# Patient Record
(demographics unavailable — no encounter records)

---

## 2024-10-11 NOTE — ASSESSMENT
[FreeTextEntry1] : ASSESSMENT: The patient comes in today with chronic exacerbated right shoulder pain.  He describes difficulty with overhead activities.  Symptoms are bothersome and are affecting his ADLs.  Symptoms today are consistent with right shoulder arthritis, tendinopathy, bursitis, impingement, weakness.  Treatment modalities were discussed, the patient elects for an injection.  We have also discussed activity modifications and home exercise program for his condition.  I do recommend physical therapy for his condition as well.   The patient was adequately and thoroughly informed of my assessment of their current condition(s).  - This may diminish bodily function for the extremity.  We discussed prognosis, treatment modalities including operative and nonoperative options for the above diagnostic assessment. As always, 2nd opinion is always provided as an option. For this, when accessible, I was able to review other physicians note(s) including reviewing other tests, imaging results as well as personally view these results for my own interpretation.      [Right] SUBACROMIAL SHOULDER INJECTION     Indication:  subacromial bursitis/impingement, pain     Risk, benefits and alternatives were discussed with the patient. Potential complications include bleeding and infection. Alcohol was used to prep the area. Ethyl chloride spray was used as a topical anesthetic.  Using sterile technique, the injection needle was then directed from a standard posterior approach parallel to and inferior to the acromion.  A 21g needle was used to inject 5 mL of 1% Lidocaine and 2 mL Kenalog (10mg). No significant resistance was encountered. A bandage was applied. The patient tolerated the procedure well. Patient instructed to avoid strenuous activity for 2 day(s). Specifically counseled regarding the signs and symptoms of potential infection and instructed to present promptly to clinic or hospital if such signs and symptoms arise.   The patient was adequately and thoroughly informed of my assessment of their current condition(s).   DISCUSSION: 1.  Right shoulder injection as above.  Activity modifications.  PT prescription provided.  HEP discussed. 2. [x] 3. [x]

## 2024-10-11 NOTE — PHYSICAL EXAM
[de-identified] : Examination of the [right] shoulder reveals equal active and passive motion as compared to the contralateral side   There is a positive Speed, positive Lorelei, positive Moreno, tenderness with anterior shoulder compression [de-identified] : [4] views of [right] shoulder were reviewed today in my office and were seen by me and discussed with the patient. These show AC arthropathy however I also do see findings consistent with rotator cuff arthropathy and glenohumeral OA moderate to severe

## 2024-10-11 NOTE — HISTORY OF PRESENT ILLNESS
[FreeTextEntry1] : Lauri is a 70-year-old male who presents today with chronic exacerbated right shoulder pain.  He describes difficulty with overhead activities.  Symptoms are bothersome and do affect his ADLs.

## 2024-11-27 NOTE — HISTORY OF PRESENT ILLNESS
[Pain Location] : pain [Worsening] : worsening [___ yrs] : [unfilled] year(s) ago [7] : a current pain level of 7/10 [de-identified] : 11/27/24) he completed Euflexxa injection in August states that his pain is worse.  He reports a fear of falling.  He uses a shopping cart and he feels secure.  He has a cane but he tends to not use it for short distance walking.  He is in therapy for the shoulder pain however he says knee pains are greater than the shoulder  08/2024) Patient is here for bilateral knee pain has been hurting for for 5 years. The pain fluctuate of its intensity Patient has a moderate to severe pain it is constant he feels achy burning pain and medial side of the knee resting makes the pain better once he wakes up and move around he has knee pain he reports stiffness patient tried physical therapy in the past which was helpful patient is taking Tylenol as needed He would like to try gel injection in both knee

## 2024-11-27 NOTE — PHYSICAL EXAM
[Antalgic] : antalgic [de-identified] : GENERAL APPEARANCE: Well nourished and hydrated, pleasant, alert, and oriented x 3. Appears their stated age.  HEENT: Normocephalic, extraocular eye motion intact. Nasal septum midline. Oral cavity clear. External auditory canal clear.  RESPIRATORY: Breath sounds clear and audible in all lobes. No wheezing, No accessory muscle use. CARDIOVASCULAR: No apparent abnormalities. No lower leg edema. No varicosities. Pedal pulses are palpable. NEUROLOGIC: Sensation is normal, no muscle weakness in the upper or lower extremities. DERMATOLOGIC: No apparent skin lesions, moist, warm, no rash. SPINE: Cervical spine appears normal and moves freely; thoracic spine appears normal and moves freely; lumbosacral spine appears normal and moves freely, normal, nontender. MUSCULOSKELETAL: Hands, wrists, and elbows are normal and move freely, shoulders are normal and move freely.  Musculoskeletal 5/5 motor strength in bilateral lower extremities. Sensory: Intact in bilateral lower extremities. DTRs: Biceps, brachioradialis, triceps, patellar, ankle and plantar 2+ and symmetric bilaterally. Pulses: dorsalis pedis, posterior tibial, femoral, popliteal, and radial 2+ and symmetric bilaterally.  Constitutional: Alert and in no acute distress, but well-appearing.   [de-identified] : Bilateral knee examination shows neutral alignment range of motion with pain 0 to 110 degree no significant joint effusion

## 2024-11-27 NOTE — PROCEDURE
[de-identified] : Patient received bilateral knee 80mg cortisone injection for osteoarthritis  I discussed at length with the patient the planned steroid and lidocaine injection. The risks, benefits, convalescence and alternatives were reviewed. The possible side effects discussed included but were not limited to: pain, swelling, heat, bleeding, and redness. Symptoms are generally mild but if they are extensive then contact the office. Giving pain relievers by mouth such as NSAIDs or Tylenol can generally treat the reactions to steroid and lidocaine. Rare cases of infection have been noted. Rash, hives and itching may occur post injection. If you have muscle pain or cramps, flushing and or swelling of the face, rapid heart beat, nausea, dizziness, fever, chills, headache, difficulty breathing, swelling in the arms or legs, or have a prickly feeling of your skin, contact a health care provider immediately. Following this discussion, the knee was prepped with Alcohol and under sterile condition the 80 mg Depo-Medrol and 6 cc Lidocaine injection was performed with a 20 gauge needle through a superolateral injection site. The needle was introduced into the joint, aspiration was performed to ensure intra-articular placement and the medication was injected. Upon withdrawal of the needle the site was cleaned with alcohol and a band aid applied. The patient tolerated the injection well and there were no adverse effects. Post injection instructions included no strenuous activity for 24 hours, cryotherapy and if there are any adverse effects to contact the office.

## 2024-11-27 NOTE — DISCUSSION/SUMMARY
[de-identified] : Medication risks reviewed. This pt 71 year M presents with moderate medial compartmental osteoarthritis of bilateral knees.  He failed to respond to HA injection .  He has a fear of falling.  the nature of condition and treatment options were discussed in detail. Pt is not yet a candidate for b/l TKA. I discussed conservative treatment such as cortisone injections, HA injections, PT, anti-inflammatories, and low impact exercise. He should continue to do low impact exercises. He may take Tylenol and NSAIDs as needed. Pt opted for bilateral knee cortisone injections today which he tolerated well.  He will begin physical therapy.  If he does not improve I will obtain repeat x-ray or advanced imaging.  Follow-up in 3 months

## 2024-12-19 NOTE — ASSESSMENT
[FreeTextEntry1] : A/P Patient with dysphagia Will get neck CT and esophagram results-patient gave us the phone number of ENT Continue pantoprazole Will do EGD with esophageal biopsies to rule out EOE  I discussed the risks and benefits of EGD and patient was given opportunity to ask questions. EGD to r/o Russo's  esophagus, PUD, mass, AVM'S. Pt is medically optimized for EGD CBC CMP requested from primary MD  New onset constipation-MiraLAX daily   I discussed the risks and benefits of colonoscopy and patient was given opportunity to ask questions. Colonoscopy to r/o colon cancer, polyps, AVM's. Patient is medically optimized for the procedure movie  prep   Addendum- I received esophogram results . No esophageal obstruction. Extensive GERD. NO zenckers . Moderate osteophytosis at C 3-4     CT neck - large anterior disc osteophyte complex at C 3-4 narrowing the  larynx  moderately.   - would do EGD ( I reviewed the Ct results with Dr Torres anesthesia- ok to do at [address] ) , then ordere MBS. Can consider neurosurgery consult regarding this  C 3-4 osteophyte

## 2024-12-19 NOTE — ASSESSMENT
[FreeTextEntry1] : A/P Patient with dysphagia Will get neck CT and esophagram results-patient gave us the phone number of ENT Continue pantoprazole Will do EGD with esophageal biopsies to rule out EOE  I discussed the risks and benefits of EGD and patient was given opportunity to ask questions. EGD to r/o Russo's  esophagus, PUD, mass, AVM'S. Pt is medically optimized for EGD CBC CMP requested from primary MD  New onset constipation-MiraLAX daily   I discussed the risks and benefits of colonoscopy and patient was given opportunity to ask questions. Colonoscopy to r/o colon cancer, polyps, AVM's. Patient is medically optimized for the procedure movie  prep   Addendum- I received esophogram results . No esophageal obstruction. Extensive GERD. NO zenckers . Moderate osteophytosis at C 3-4     CT neck - large anterior disc osteophyte complex at C 3-4 narrowing the  larynx  moderately.   - would do EGD ( I reviewed the Ct results with Dr Torres anesthesia- ok to do at [address] ) , then ordere MBS. Can consider neurosurgery consult regarding this  C 3-4 osteophyte

## 2024-12-19 NOTE — HISTORY OF PRESENT ILLNESS
[FreeTextEntry1] : Patient with a history of hypertension.  He has a "pinched nerve in his cervical spine "receives PT   Patient here with dysphagia for 4 to 5 months.  He is a smoker.  States that food gets stuck in the cervical esophagus (pointing at the sternal notch and he must regurgitate the food.  It occurs with hard foods such as steaks.  Increased phlegm.  No cough.  Particularly wakes up in the morning with phlegm.  He saw ENT.  He had a laryngoscopy CAT scan and esophagram.  He does not know the name of the ENT but has the phone number.  I do not have the records.  His primary MD started pantoprazole 40 mg a day with no improvement of his symptoms.  He had some weight loss.  Previously 110 pounds now 187 after 5 to 6 months.  Patient has constipation for the past 3 to 4 months.  Has 2-3 small bowel movements a day but has fecal urgency.  Has incomplete evacuation.  No new medications aside from pantoprazole.  No rectal bleeding.  Last colonoscopy -8 years ago as per patient.  No family history of colon cancer colon polyps.

## 2025-02-05 NOTE — PHYSICAL EXAM
[Normal] : Gait: normal [de-identified] : GENERAL APPEARANCE:   Well nourished and hydrated, pleasant, alert, and oriented x 3.  Appears their stated age.   Respiratory: No wheeezing, breath sounds clear. No cyanosis, no us of accesory musculature  CARDIOVASCULAR:   No apparent abnormalities.  No lower leg edema.  No varicosities.  Pedal pulses are palpable. NEUROLOGIC:   Sensation is normal, no muscle weakness in the upper or lower extremities. DERMATOLOGIC:   No apparent skin lesions, moist, warm, no rash. SPINE:   Cervical spine appears normal and moves freely; thoracic spine appears normal and moves freely; lumbosacral spine appears normal and moves freely, normal, nontender. MUSCULOSKELETAL:   Hands, wrists, and elbows are normal and move freely, shoulders are normal and move freely.  Both knees are examined gross crepitus with range of motion he has diffuse tenderness the joint line

## 2025-02-05 NOTE — HISTORY OF PRESENT ILLNESS
[de-identified] : History of Present Illness 11/27/24) he completed Euflexxa injection in August states that his pain is worse. He reports a fear of falling. He uses a shopping cart and he feels secure. He has a cane but he tends to not use it for short distance walking. He is in therapy for the shoulder pain however he says knee pains are greater than the shoulder  08/2024) Patient is here for bilateral knee pain has been hurting for for 5 years. The pain fluctuate of its intensity Patient has a moderate to severe pain it is constant he feels achy burning pain and medial side of the knee resting makes the pain better once he wakes up and move around he has knee pain he reports stiffness patient tried physical therapy in the past which was helpful patient is taking Tylenol as needed  In November he tried gel injections for both knees.  He reports little relief.  He is interested in pursuing injections again today       ZIA MEHTA presents with pain. He states the symptoms are worsening. The patient mentions the symptoms started 5 year(s) ago. Pain levels include a current pain level of 7/10.

## 2025-02-05 NOTE — PROCEDURE
[de-identified] : Patient received bilateral intra-articular steroid injections.   I discussed at length with the patient the planned steroid and lidocaine injection. The risks, benefits, convalescence and alternatives were reviewed. The possible side effects discussed included but were not limited to: pain, swelling, heat, bleeding, and redness. Symptoms are generally mild but if they are extensive then contact the office. Giving pain relievers by mouth such as NSAIDs or Tylenol can generally treat the reactions to steroid and lidocaine. Rare cases of infection have been noted. Rash, hives and itching may occur post injection. If you have muscle pain or cramps, flushing and or swelling of the face, rapid heart beat, nausea, dizziness, fever, chills, headache, difficulty breathing, swelling in the arms or legs, or have a prickly feeling of your skin, contact a health care provider immediately. Following this discussion, the knee was prepped with Alcohol and under sterile condition the 80 mg Depo-Medrol and 6 cc Lidocaine injection was performed with a 20 gauge needle through a superolateral injection site. The needle was introduced into the joint, aspiration was performed to ensure intra-articular placement and the medication was injected. Upon withdrawal of the needle the site was cleaned with alcohol and a band aid applied. The patient tolerated the injection well and there were no adverse effects. Post injection instructions included no strenuous activity for 24 hours, cryotherapy and if there are any adverse effects to contact the office.

## 2025-02-05 NOTE — DISCUSSION/SUMMARY
[de-identified] : Patient presents back to the office 71-year-old male with advanced arthritis of both knees.  We have done the hyaluronic acid injections with him in the past he request steroid injections today.  Ultimately he is a candidate for knee replacement.  Will get a continue to exhaust conservative treatment.  He received cortisone injections today and tolerated them well   The patient is a 71 old individual with end stage arthritis of their bilateral knee joint. The patient has exhausted a minimum of 3 months conservative treatment including prior injections (cortisone and/or hyaluronic acid injections), physical therapy, over the counter NSAIDS and pain medication where indicated. In addition, the patient's quality of life is diminished due to significant chronic pain. The patient is having difficulty with activities of daily living, including ambulating, descending stairs, and rising from a seated position. Based upon the patients continued symptoms and failure to respond to conservative treatment, I have recommended a bilateral total knee replacement for this patient. A long discussion took place with the patient describing what a total joint replacement is and what the procedure would entail. A knee model, similar to the implant that will be used during the operation, was utilized to demonstrate and to discuss the various bearing surfaces of the implants. Implant fixation, use of cement, was also discussed with the patient. Choices of implant manufacturers, mainly [employer] and [employer], were discussed and reviewed with preference to be made by patient and surgeon prior to operation. Final selection to be based on customary practice as well as preoperative templating with selection confirmed intraoperatively based on the patient's anatomy. The patient participated and agreed with the decision-making process. The hospitalization and post-operative care and rehabilitation were also discussed. The use of perioperative antibiotics and DVT prophylaxis were discussed. The risk, benefits and alternatives to a surgical intervention were discussed at length with the patient. The patient was also advised of risks related to the medical comorbidities and elevated body mass index (BMI). A lengthy discussion took place to review the most common complications including but not limited to: deep vein thrombosis, pulmonary embolism, heart attack, stroke, infection, wound breakdown, numbness, damage to nerves, tendon, muscles, arteries or other blood vessels, death and other possible complications from anesthesia. The patient was told that we will take steps to minimize these risks by using sterile technique, antibiotics and DVT prophylaxis when appropriate and follow the patient postoperatively in the office setting to monitor progress. The possibility of recurrent pain, no improvement in pain and actual worsening of pain were also discussed with the patient. The discharge plan of care focused on the patient going home following surgery. The patient was encouraged to make the necessary arrangements to have someone stay with them when they are discharged home. Following discharge, a home care nurse will visit the patient. The home care nurse will open your home care case and request home physical therapy services. Home physical therapy will commence following discharge provided it is appropriate and covered by the health insurance benefit plan. The benefits of surgery were discussed with the patient including the potential for improving his/her current clinical condition through operative intervention. Alternatives to surgical intervention including continued conservative management were also discussed in detail. All questions were answered to the satisfaction of the patient. The treatment plan of care, as well as a model of a total knee equivalent to the one that will be used for their total joint replacement, was shared with the patient. The patient participated and agreed to the plan of care as well as the use of the recommended implants for their total joint replacement surgery.

## 2025-03-10 NOTE — PHYSICAL EXAM
[Alert] : alert [Normal Voice/Communication] : normal voice/communication [Healthy Appearing] : healthy appearing [No Respiratory Distress] : no respiratory distress [No Acc Muscle Use] : no accessory muscle use [Respiration, Rhythm And Depth] : normal respiratory rhythm and effort [Auscultation Breath Sounds / Voice Sounds] : lungs were clear to auscultation bilaterally [Heart Rate And Rhythm] : heart rate was normal and rhythm regular [Normal S1, S2] : normal S1 and S2 [Bowel Sounds] : normal bowel sounds [Abdomen Tenderness] : non-tender [No Masses] : no abdominal mass palpated [Oriented To Time, Place, And Person] : oriented to person, place, and time

## 2025-03-10 NOTE — ASSESSMENT
[FreeTextEntry1] : A/P constipation, dysphagia    I discussed the risks and benefits of EGD and patient was given opportunity to ask questions. EGD to r/o Russo's  esophagus, PUD, mass, AVM'S. Pt is medically optimized for EGD  I discussed the risks and benefits of colonoscopy and patient was given opportunity to ask questions. Colonoscopy to r/o colon cancer, polyps, AVM's. Patient is medically optimized for the procedure

## 2025-04-10 NOTE — HISTORY OF PRESENT ILLNESS
[FreeTextEntry1] : dysphagia  [de-identified] : ZIA MEHTA is a 71 year old male presents for initial neurosurgical evaluation of cervical osteophytes causing dysphagia.  The patient states that he has an approximate 1 year history of change in tone of the voice as well as difficulty with swallowing.  This affects both salt and liquids.  He denies weight loss.  He has undergone a formal swallow evaluation which shows compromised phases of swallowing secondary to his cervical osteophytes.

## 2025-04-10 NOTE — ASSESSMENT
[FreeTextEntry1] : Mr. Palmer presents for initial neurosurgical evaluation of dysphagia secondary to cervical osteophytes.  I will see him back upon completion of the CT as well as an MRI of the cervical spine.  I have referred him to Dr. Ochoa for ENT evaluation and anticipated surgical planning.  The patient knows to call the office with any new or concerning symptoms in the interim.  I, Dr. Los Sanchez, personally performed the evaluation and management (E/M) services for this patient. That E/M includes assessment of the initial examination, assessing the pertinent medical and surgical history, and establishing the plan of care. At the time of the visit, my ACP, Polly Garza, actively participated in the evaluation and management services for this patient to be followed going forward in accordance with the plan documented in the clinical note.

## 2025-04-10 NOTE — PHYSICAL EXAM
[General Appearance - Alert] : alert [General Appearance - In No Acute Distress] : in no acute distress [General Appearance - Well Nourished] : well nourished [General Appearance - Well Developed] : well developed [General Appearance - Well-Appearing] : healthy appearing [Oriented To Time, Place, And Person] : oriented to person, place, and time [Person] : oriented to person [Place] : oriented to place [Time] : oriented to time [Short Term Intact] : short term memory intact [Concentration Intact] : normal concentrating ability [Fluency] : fluency intact [Cranial Nerves Optic (II)] : visual acuity intact bilaterally,  pupils equal round and reactive to light [Cranial Nerves Oculomotor (III)] : extraocular motion intact [Cranial Nerves Facial (VII)] : face symmetrical [Motor Tone] : muscle tone was normal in all four extremities [Motor Strength] : muscle strength was normal in all four extremities [Sensation Tactile Decrease] : light touch was intact [Abnormal Walk] : normal gait

## 2025-04-10 NOTE — REASON FOR VISIT
[New Patient Visit] : a new patient visit [Referred By: _________] : Patient was referred by KAYLYN [FreeTextEntry1] : osteophyte of cervical spine

## 2025-04-10 NOTE — HISTORY OF PRESENT ILLNESS
[FreeTextEntry1] : dysphagia  [de-identified] : ZIA MEHTA is a 71 year old male presents for initial neurosurgical evaluation of cervical osteophytes causing dysphagia.  The patient states that he has an approximate 1 year history of change in tone of the voice as well as difficulty with swallowing.  This affects both salt and liquids.  He denies weight loss.  He has undergone a formal swallow evaluation which shows compromised phases of swallowing secondary to his cervical osteophytes.

## 2025-05-15 NOTE — ASSESSMENT
[FreeTextEntry1] : Mr. Palmer presents today with MRI and CT cervical spine that shows anterior osteophytes that contribute to his dysphagia.  I would like him to be evaluated by Dr. Snyder so we can coordinate surgery for resection of the anterior osteophytes to assist in swallowing.    Plan: ENT referral  Risks /benefits discussed for surgery. Patient wishes to proceed Patient has been given an opportunity to ask and have their questions answered to their satisfaction. Patient knows to call the office if there are any new or worsening symptoms.     I, Dr. Los Sanchez, personally performed the evaluation and management (E/M) services for this established patient who presents today. That E/M includes conducting the clinically appropriate interval history &/or exam and establishing a continued plan of care. Today, my PAIGE, Polly Garza, was here to observe my evaluation and management service for this patient and follow the plan of care established by me going forward.

## 2025-05-15 NOTE — DATA REVIEWED
[de-identified] : EXAM: [id number] - MR SPINE CERVICAL - ORDERED BY: RUSSELL MARTEL   PROCEDURE DATE: 05/02/2025    INTERPRETATION: EXAMINATION: MR CERVICAL SPINE  CLINICAL INDICATION: osteophytes of cervical spine - dysphagia; TECHNIQUE: Multiplanar MRI images of the cervical spine were obtained without contrast. COMPARISON: Cervical spine CT 5/2/2025 and 10/25/2021.  FINDINGS:  There are flowing osteophytes across multiple levels in the spine, consistent with diffuse skeletal hyperostosis (DISH). There are large anterior osteophytes bulging into the hypopharynx from C3-C7. There is associated bony bridging from C4-C7. There is T2 hyperintensity in the disc space at C5-6 related to developing ankylosis. There is disc desiccation from C2-C5.  C1-C2: There is cervical pannus and hypertrophic changes, with mild narrowing of the central canal. C2-C3: Broad-based disc osteophyte complex eccentric to the left and uncovertebral and facet osteophyte. Thickening of the ligamentum flavum. Mild-to-moderate central canal stenosis. Moderate to severe left foraminal stenosis. C3-C4: Concentric broad-based disc osteophyte complex and uncovertebral and facet osteophyte. Superimposed shallow central protrusion. Mild to moderate central canal stenosis. Moderate to severe bilateral foraminal stenoses. C4-C5: Broad-based disc osteophyte complex eccentric to the left and uncovertebral and facet osteophyte. Mild narrowing of the central canal. Mild right and moderate to severe left foraminal stenoses. C5-C6: Concentric broad-based disc osteophyte complex and uncovertebral and facet osteophyte. Mild narrowing of the central canal and neural foramina. C6-C7: Mild bilateral foraminal narrowing. C7-T1: Uncovertebral osteophyte with mild-to-moderate right foraminal stenosis.  The central canal and neural foramina in the cervical spine are otherwise negative. Vertebral body height, alignment, marrow, posterior fossa, cord are otherwise negative. No prevertebral soft tissue swelling. The paraspinal musculature is normal in signal and bulk.  IMPRESSION:  1. Anterior osteophytes bulging into the hypopharynx, with associated bony bridging. 2. Moderate to severe left C2-3, bilateral C3-4, left C4-5 foraminal stenoses. 3. Mild-to-moderate C3-4 central canal stenosis.

## 2025-05-15 NOTE — REASON FOR VISIT
[Follow-Up: _____] : a [unfilled] follow-up visit [FreeTextEntry1] : ZIA MEHTA is a 71 year old male presents for initial neurosurgical evaluation of cervical osteophytes causing dysphagia. The patient states that he has an approximate 1 year history of change in tone of the voice as well as difficulty with swallowing. This affects both salt and liquids. He denies weight loss. He has undergone a formal swallow evaluation which shows compromised phases of swallowing secondary to his cervical osteophytes.

## 2025-05-15 NOTE — PHYSICAL EXAM
[General Appearance - Alert] : alert [General Appearance - In No Acute Distress] : in no acute distress [Oriented To Time, Place, And Person] : oriented to person, place, and time [Person] : oriented to person [Cranial Nerves Trigeminal (V)] : facial sensation intact symmetrically [Cranial Nerves Facial (VII)] : face symmetrical [Motor Tone] : muscle tone was normal in all four extremities

## 2025-05-15 NOTE — DATA REVIEWED
[de-identified] : EXAM: [id number] - MR SPINE CERVICAL - ORDERED BY: RUSSELL MARTEL   PROCEDURE DATE: 05/02/2025    INTERPRETATION: EXAMINATION: MR CERVICAL SPINE  CLINICAL INDICATION: osteophytes of cervical spine - dysphagia; TECHNIQUE: Multiplanar MRI images of the cervical spine were obtained without contrast. COMPARISON: Cervical spine CT 5/2/2025 and 10/25/2021.  FINDINGS:  There are flowing osteophytes across multiple levels in the spine, consistent with diffuse skeletal hyperostosis (DISH). There are large anterior osteophytes bulging into the hypopharynx from C3-C7. There is associated bony bridging from C4-C7. There is T2 hyperintensity in the disc space at C5-6 related to developing ankylosis. There is disc desiccation from C2-C5.  C1-C2: There is cervical pannus and hypertrophic changes, with mild narrowing of the central canal. C2-C3: Broad-based disc osteophyte complex eccentric to the left and uncovertebral and facet osteophyte. Thickening of the ligamentum flavum. Mild-to-moderate central canal stenosis. Moderate to severe left foraminal stenosis. C3-C4: Concentric broad-based disc osteophyte complex and uncovertebral and facet osteophyte. Superimposed shallow central protrusion. Mild to moderate central canal stenosis. Moderate to severe bilateral foraminal stenoses. C4-C5: Broad-based disc osteophyte complex eccentric to the left and uncovertebral and facet osteophyte. Mild narrowing of the central canal. Mild right and moderate to severe left foraminal stenoses. C5-C6: Concentric broad-based disc osteophyte complex and uncovertebral and facet osteophyte. Mild narrowing of the central canal and neural foramina. C6-C7: Mild bilateral foraminal narrowing. C7-T1: Uncovertebral osteophyte with mild-to-moderate right foraminal stenosis.  The central canal and neural foramina in the cervical spine are otherwise negative. Vertebral body height, alignment, marrow, posterior fossa, cord are otherwise negative. No prevertebral soft tissue swelling. The paraspinal musculature is normal in signal and bulk.  IMPRESSION:  1. Anterior osteophytes bulging into the hypopharynx, with associated bony bridging. 2. Moderate to severe left C2-3, bilateral C3-4, left C4-5 foraminal stenoses. 3. Mild-to-moderate C3-4 central canal stenosis.

## 2025-05-21 NOTE — HISTORY OF PRESENT ILLNESS
[de-identified] : 71M presents for evaluation of worsening dysphagia x7-8 months. Patient has dysphagia to all consistencies and states that foods will get stuck in his throat when he swallows. Patient reports dysphonia, throat irritation, occasional burning with swallowing. MRI from 5/2/25 demonstrates anterior osteophytes bulging into the hypopharynx, with associated bony bridging. MBS with Cricopharyngeal bar noted ~ C5-C6. Patient has been smoking 0.5ppd x20-30 years. Rare alcohol use. Patient denies globus, otalgia, choking or aspirating.

## 2025-05-21 NOTE — ASSESSMENT
[FreeTextEntry1] : Pt with severely diminished LVC function.  He notes voice changes over the past 8 months as well.  Will obtain a chest CT to R/O a mediastinal lesion.    Pt's osteophytes may be contributing to his dysphagia.  If no other source is identified we will consider removal of osteophytes with Dr. Sanchez.

## 2025-05-21 NOTE — DATA REVIEWED
[de-identified] : EXAM: [id number] - MR SPINE CERVICAL - ORDERED BY: RUSSELL MARTEL   PROCEDURE DATE: 05/02/2025    INTERPRETATION: EXAMINATION: MR CERVICAL SPINE  CLINICAL INDICATION: osteophytes of cervical spine - dysphagia; TECHNIQUE: Multiplanar MRI images of the cervical spine were obtained without contrast. COMPARISON: Cervical spine CT 5/2/2025 and 10/25/2021.  FINDINGS:  There are flowing osteophytes across multiple levels in the spine, consistent with diffuse skeletal hyperostosis (DISH). There are large anterior osteophytes bulging into the hypopharynx from C3-C7. There is associated bony bridging from C4-C7. There is T2 hyperintensity in the disc space at C5-6 related to developing ankylosis. There is disc desiccation from C2-C5.  C1-C2: There is cervical pannus and hypertrophic changes, with mild narrowing of the central canal. C2-C3: Broad-based disc osteophyte complex eccentric to the left and uncovertebral and facet osteophyte. Thickening of the ligamentum flavum. Mild-to-moderate central canal stenosis. Moderate to severe left foraminal stenosis. C3-C4: Concentric broad-based disc osteophyte complex and uncovertebral and facet osteophyte. Superimposed shallow central protrusion. Mild to moderate central canal stenosis. Moderate to severe bilateral foraminal stenoses. C4-C5: Broad-based disc osteophyte complex eccentric to the left and uncovertebral and facet osteophyte. Mild narrowing of the central canal. Mild right and moderate to severe left foraminal stenoses. C5-C6: Concentric broad-based disc osteophyte complex and uncovertebral and facet osteophyte. Mild narrowing of the central canal and neural foramina. C6-C7: Mild bilateral foraminal narrowing. C7-T1: Uncovertebral osteophyte with mild-to-moderate right foraminal stenosis.  The central canal and neural foramina in the cervical spine are otherwise negative. Vertebral body height, alignment, marrow, posterior fossa, cord are otherwise negative. No prevertebral soft tissue swelling. The paraspinal musculature is normal in signal and bulk.  IMPRESSION:  1. Anterior osteophytes bulging into the hypopharynx, with associated bony bridging. 2. Moderate to severe left C2-3, bilateral C3-4, left C4-5 foraminal stenoses. 3. Mild-to-moderate C3-4 central canal stenosis. [de-identified] : Swallow VFSS/MBS Assessment Adult: General Information: - Patient Profile Review yes - H & P Review yes - Specify reason(s) To objectively assess swallow - Referring Physician Dr. Sanchez - General Observations Pt seen seated upright in chair, awake/alert, hoarse vocal quality, 0/10 pain pre/post - Pertinent History of Current Problem As per pt report: "I cannot swallow anything solid, it gets stuck & I bring it up". I was told I have "bones in there" - Comments Pt reported completion of EGD recently, unable to report results except: "I have to take medicine"  Past Medical History: - Hypertension: Entered Date: 21-Feb-2020 11:13, Entered By: Sara Angelo, Last Modified By: Sara Angelo  Past Surgical History: - No significant past surgical history: Entered By: Armaan Muñoz, Entered Date: 15-Oct-2021 10:49, Last Modified By: Armaan Muñoz  Preliminary Fluoroscopy: - Additional Information C3-C4 osteophyte complex  VFSS/MBS Eval: Trial 1 - Mode of Presentation self fed; cup - Consistencies administered thin liquid - Positioning Lateral  Oral Prep Phase: - Oral Preparatory Phase Functional  Oral Phase: - Oral Phase Uncontrolled bolus / spillover in servando-pharynx; Uncontrolled bolus / spillover in hypopharynx  Pharyngeal Phase: - Intact No - Inadequate hyolaryngeal elevation observed - Laryngeal penetration during the swallow - silent observed - Residue in valleculae Moderate - Residue in pyriform sinuses Trace - Aspiration after swallow - silent observed - Pharyngeal Phase Comments stasis reduced with successive swallows  Rosenbek's Penetration Aspiration Scale: - Rosenbek's Penetration Aspiration Scale (3) contrast remains above the vocal cords, visible residue remains (penetration) 2/6 trials, penetration/aspiration scale score: 5 in 2/6 trials, Penetration/aspiration scale score: 8 in 2/6 trials  VFSS/MBS Eval: Trial 2 - Mode of Presentation self fed; spoon - Consistencies administered pureed - Positioning Lateral  Oral Prep Phase: - Oral Preparatory Phase Functional  Oral Phase: - Oral Phase Uncontrolled bolus / spillover in servando-pharynx  Pharyngeal Phase: - Intact No - Inadequate hyolaryngeal elevation observed - Laryngeal penetration during the swallow - silent observed - Residue in valleculae Mild - Residue in pyriform sinuses Mild; Moderate; reduced with successive swallow - Pharyngeal Phase Comments Cricopharyngeal bar noted ~ C5-C6  Esophageal Phase: - Esophageal Stage Retrograde progression to level of pyriform sinus  Rosenbek's Penetration Aspiration Scale: - Rosenbek's Penetration Aspiration Scale (5) contrast contacts vocal cords, visible residue remains (penetration)  Strategies Tried During Procedure: - Unsuccessful Strategies Trialed During Procedure chin tuck; head turn to the right; head turn to the left  VFSS/MBS Eval: Trial 3 - Mode of Presentation cup; self fed - Consistencies administered thin liquid; via compensatory postures - Positioning Lateral  Oral Prep Phase: - Oral Preparatory Phase Functional  Pharyngeal Phase: - Intact No - Inadequate hyolaryngeal elevation observed - Laryngeal penetration during the swallow - silent observed - Laryngeal penetration after the swallow - silent observed  Rosenbek's Penetration Aspiration Scale: - Rosenbek's Penetration Aspiration Scale (3) contrast remains above the vocal cords, visible residue remains (penetration) via left head turn, penetration/aspirtiaon scale score: 5 via right head turn & chin down posture  VFSS/MBS Eval: Trial 4 - Mode of Presentation fed by clinician - Consistencies administered easy to chew - Positioning Lateral  Oral Prep Phase: - Oral Preparatory Phase Functional  Oral Phase: - Oral Phase Comments piecemeal deglutition  Pharyngeal Phase: - Intact No - Laryngeal penetration during the swallow - silent observed - Residue in valleculae Moderate - Residue in pyriform sinuses Mild - Laryngeal penetration after the swallow - silent observed - Pharyngeal Phase Comments stasis reduced with successive swallows  Wet gurgly vocal quality noted post swallow  Rosenbek's Penetration Aspiration Scale: - Rosenbek's Penetration Aspiration Scale (3) contrast remains above the vocal cords, visible residue remains (penetration)  Recommendations: - Diagnostic Impressions Mild oral dysphagia with reduced lingual strength & coordination Moderate pharyngeal dysphagia for assessed trials. Reduced tongue base retraction, decreased laryngeal elevation/hyolarnygeal excursion & airway closure noted, in addition to presence of osteophyte complex at ~C3-C4. Pt with stasis noted in pyriform sinus & silent penetration during the swallow/silent aspiration post swallow for thin fluids NOT improved with trialed compensatory postures. Stasis noted in pyriform sinus for puree & in both valleculae/pyriform sinus for easy to chew solids. Penetration during the swallow also viewed for puree & penetration during/after the swallow for easy to chew solid trials. OF note, retrograde progression of puree was observed to level of pyriform sinus with cricopharyngeal bar noted at ~C5-C6. - Recommended Consistencies Defer continuation of PO to referring MD given above noted findings. Pt is at risk for aspiration with assessed trials given impact of osteophytes on swallow profile - Recommended Feeding/Eating Techniques allow for swallow between intakes; crush medication (when feasible); maintain upright posture during/after eating for 30 mins; oral hygiene; position upright (90 degrees); provide rest periods between swallows; small sips/bites - Aspiration Precautions yes - Monitor for Signs of Aspiration change of breathing pattern; oral hygiene; position upright (90Y); cough; gurgly voice; fever; pneumonia; throat clearing; upper respiratory infection - Demonstrates Need for Referral to Another Service Follow-up referring GI - Additional Recommendations Consider neurosurgical consultation given osteophytes & impact on swallow profile - The above findings were discussed with Patient; Referring physician e-mailed re: above findings   Electronic Signatures: Griselda Queen (Speech Pathologist) (Signed 31-Mar-2025 15:13) Authored: Swallow VFSS/MBS Assessment Adult, Recommendations   Last Updated: 31-Mar-2025 15:13 by Griselda Queen (Speech Pathologist)

## 2025-05-21 NOTE — PROCEDURE
[Flexible Endoscope] : examined with the flexible endoscope [Serial Number: ___] : Serial Number: [unfilled] [Normal] : the false vocal folds were pink and regular, the ventricular sulcus was open, the true vocal folds were glistening white, tense and of equal length, mobility, and height [True Vocal Cords Erythematous] : no true vocal cord edema [True Vocal Cords Dial's Nodules] : no true vocal cord nodules [Glottis Arytenoid Cartilages Erythema] : bilateral arytenoid ~M erythema [Arytenoid Edema ___ /4] : arytenoid edema [unfilled]U/4 [Arytenoid Erythema ___ /4] : arytenoid erythema [unfilled]U/4 [Interarytenoid Edema] : interarytenoid area edematous [True Vocal Cords Unilateral Paralysis] : true vocal cord paralysis on the left [Glottis Arytenoid Cartilages] : no arytenoid granulomas [FreeTextEntry9] : posterior pharyngeal wall fullness

## 2025-05-21 NOTE — CONSULT LETTER
[Dear  ___] : Dear  [unfilled], [Please see my note below.] : Please see my note below. [Sincerely,] : Sincerely, [DrToña  ___] : Dr. PATIÑO [Consult Letter:] : I had the pleasure of evaluating your patient, [unfilled]. [Consult Closing:] : Thank you very much for allowing me to participate in the care of this patient.  If you have any questions, please do not hesitate to contact me. [FreeTextEntry2] : Los Sanchez MD [FreeTextEntry3] : April Darling PA-C Department of Otolaryngology [facility] [place]   Hung Ochoa MD, FACS Chief of Otolaryngology at [facility]  Dept. of Otolaryngology [facility]

## 2025-06-02 NOTE — ASSESSMENT
[FreeTextEntry1] : Mr. Palmer presents today with MRI and CT cervical spine that shows anterior osteophytes that contribute to his dysphagia.  I would like him to be evaluated by Dr. Snyder so we can coordinate surgery for resection of the anterior osteophytes to assist in swallowing.  He is pending CTS surgery as well end of month.   Plan: f/u with ENT to review CT chest mediastinum Risks /benefits discussed for surgery. Patient wishes to proceed Patient has been given an opportunity to ask and have their questions answered to their satisfaction. Patient knows to call the office if there are any new or worsening symptoms.     I, Dr. Los Sanchez, personally performed the evaluation and management (E/M) services for this established patient who presents today. That E/M includes conducting the clinically appropriate interval history &/or exam and establishing a continued plan of care. Today, my PAIGE, Polly Garza, was here to observe my evaluation and management service for this patient and follow the plan of care established by me going forward.

## 2025-07-01 NOTE — REASON FOR VISIT
[Initial Consultation] : an initial consultation for [FreeTextEntry2] : dysphonia, dysphagia, and left vocal fold immobility.

## 2025-07-01 NOTE — PROCEDURE
[de-identified] : Stroboscopic Laryngoscopy Procedure Note: Indications: Assess laryngeal biomechanics and vocal fold oscillation. Description of Procedure: Informed consent was verbally obtained from the patient prior to the procedure. The patient was seated in the clinic chair. Topical anesthesia was achieved by first spraying the nasal cavities with 4% lidocaine and nasal decongestant. Findings: Supraglottis: no masses or lesions Glottis:  Vocal cords:                       Right: crisp and shows no lesions or masses                       Left:  crisp and shows no lesions or masses                Mobility:                       Right:  normal                       Left:  normal                Amplitude:                       Right:  normal                       Left:  normal                Closure: complete                Wave symmetry:  symmetric Subglottis: no masses or lesions within the visualized subglottis. Visualized airway is widely patent. Other: fullness posterior pharyngeal wall at level of epiglotis

## 2025-07-01 NOTE — HISTORY OF PRESENT ILLNESS
[de-identified] : ZIA MEHTA is a 71 year old man who presents to the [facility] with dysphonia, dysphagia, and DISH.  He is referred by Dr. Hung Ochoa who last saw patient on 5/21/25. This problem has been going on for a year.  They describe their voice as raspy and garbled.  He denies periods of normal voicing. he has difficulties with swallowing, consuming soft foods, thin liquids and smoothies with constant coughing and choking episodes  Reports constant thick mucus in the morning and PND He denies frequent classic heartburn symptoms. Smoking history: Current Smoker  0.5ppd x20-30 years   VOCAL DEMANDS: His vocal demands are primarily those of conversational  Following with neuro for cervical osteophytes  MRI 5/2/25: Anterior osteophytes bulging into the hypopharynx, with associated bony bridiging  MBS performed on 03/31/25 and reviewed by myself shows: No CP bar or Zenkers. Moderate residue pyriforms and vallecula. Intermittent deep penetration. Large cervical osteophytes.   CT chest with con performed on 5/28/25 shows: AIRWAYS AND LUNGS: The central tracheobronchial tree is patent.  Scattered linear and subtle groundglass opacities are similar to prior. HEART AND VESSELS: There is mild cardiomegaly.

## 2025-07-01 NOTE — HISTORY OF PRESENT ILLNESS
[de-identified] : ZIA MEHTA is a 71 year old man who presents to the [facility] with dysphonia, dysphagia, and DISH.  He is referred by Dr. Hung Ochoa who last saw patient on 5/21/25. This problem has been going on for a year.  They describe their voice as raspy and garbled.  He denies periods of normal voicing. he has difficulties with swallowing, consuming soft foods, thin liquids and smoothies with constant coughing and choking episodes  Reports constant thick mucus in the morning and PND He denies frequent classic heartburn symptoms. Smoking history: Current Smoker  0.5ppd x20-30 years   VOCAL DEMANDS: His vocal demands are primarily those of conversational  Following with neuro for cervical osteophytes  MRI 5/2/25: Anterior osteophytes bulging into the hypopharynx, with associated bony bridiging  MBS performed on 03/31/25 and reviewed by myself shows: No CP bar or Zenkers. Moderate residue pyriforms and vallecula. Intermittent deep penetration. Large cervical osteophytes.   CT chest with con performed on 5/28/25 shows: AIRWAYS AND LUNGS: The central tracheobronchial tree is patent.  Scattered linear and subtle groundglass opacities are similar to prior. HEART AND VESSELS: There is mild cardiomegaly.

## 2025-07-01 NOTE — CONSULT LETTER
[Dear  ___] : Dear  [unfilled], [Consult Letter:] : I had the pleasure of evaluating your patient, [unfilled]. [Please see my note below.] : Please see my note below. [Consult Closing:] : Thank you very much for allowing me to participate in the care of this patient.  If you have any questions, please do not hesitate to contact me. [Sincerely,] : Sincerely, [FreeTextEntry2] : Dr. Hung Ochoa [FreeTextEntry3] : Jordy Hoff M.D. Division of Laryngology | Department of Otolaryngology  [address] [address]

## 2025-07-01 NOTE — ASSESSMENT
[FreeTextEntry1] : Assessment/Plan: #1 Dysphagia #2 Dysphonia #3 DISH #4 Dysarthria of unk etiology  Patient is waiting on scheduling for excision of cervical osteophytes from Dr. Sanchez and Dr. Ochoa. I expect this will significantly improve his dysphagia.  Should he still have any dysphagia afterwards he would need repeat MBS.   Patient has full mobility of bilateral vocal folds on today's exam. It is possible that on prior exam he was bring due to discomfort leading to appearance of vocal fold immobility.  Cleared for osteophyte removal. May follow up with me as needed.  Total time: 30 minutes; total time does not include time spent performing the procedure and its related elements. It does include all other face to face and non face to face pre and post visit tasks performed on the same date of service.

## 2025-07-01 NOTE — CONSULT LETTER
[Dear  ___] : Dear  [unfilled], [Consult Letter:] : I had the pleasure of evaluating your patient, [unfilled]. [Please see my note below.] : Please see my note below. [Consult Closing:] : Thank you very much for allowing me to participate in the care of this patient.  If you have any questions, please do not hesitate to contact me. [Sincerely,] : Sincerely, [FreeTextEntry2] : Dr. Hung Ochoa [FreeTextEntry3] : Jordy Hoff M.D. Division of Laryngology | Department of Otolaryngology  [address] [address]

## 2025-07-01 NOTE — PROCEDURE
[de-identified] : Stroboscopic Laryngoscopy Procedure Note: Indications: Assess laryngeal biomechanics and vocal fold oscillation. Description of Procedure: Informed consent was verbally obtained from the patient prior to the procedure. The patient was seated in the clinic chair. Topical anesthesia was achieved by first spraying the nasal cavities with 4% lidocaine and nasal decongestant. Findings: Supraglottis: no masses or lesions Glottis:  Vocal cords:                       Right: crisp and shows no lesions or masses                       Left:  crisp and shows no lesions or masses                Mobility:                       Right:  normal                       Left:  normal                Amplitude:                       Right:  normal                       Left:  normal                Closure: complete                Wave symmetry:  symmetric Subglottis: no masses or lesions within the visualized subglottis. Visualized airway is widely patent. Other: fullness posterior pharyngeal wall at level of epiglotis